# Patient Record
Sex: FEMALE | Race: BLACK OR AFRICAN AMERICAN | Employment: FULL TIME | URBAN - METROPOLITAN AREA
[De-identification: names, ages, dates, MRNs, and addresses within clinical notes are randomized per-mention and may not be internally consistent; named-entity substitution may affect disease eponyms.]

---

## 2017-02-13 ENCOUNTER — TELEPHONE (OUTPATIENT)
Dept: FAMILY MEDICINE CLINIC | Age: 27
End: 2017-02-13

## 2017-02-13 NOTE — TELEPHONE ENCOUNTER
Pt is calling; wants to ask the nurse a question; refuses to tell me what her question is; please advise

## 2017-10-13 ENCOUNTER — OFFICE VISIT (OUTPATIENT)
Dept: FAMILY MEDICINE CLINIC | Age: 27
End: 2017-10-13

## 2017-10-13 VITALS
SYSTOLIC BLOOD PRESSURE: 118 MMHG | BODY MASS INDEX: 38.04 KG/M2 | WEIGHT: 242.4 LBS | TEMPERATURE: 98 F | HEIGHT: 67 IN | DIASTOLIC BLOOD PRESSURE: 86 MMHG | HEART RATE: 76 BPM | OXYGEN SATURATION: 98 % | RESPIRATION RATE: 18 BRPM

## 2017-10-13 DIAGNOSIS — E55.9 VITAMIN D DEFICIENCY: ICD-10-CM

## 2017-10-13 DIAGNOSIS — M23.91 INTERNAL DERANGEMENT OF RIGHT KNEE: Primary | ICD-10-CM

## 2017-10-13 RX ORDER — ERGOCALCIFEROL 1.25 MG/1
50000 CAPSULE ORAL
Qty: 12 CAP | Refills: 3 | Status: SHIPPED | OUTPATIENT
Start: 2017-10-13 | End: 2017-10-13 | Stop reason: SDDI
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
## 2017-10-13 NOTE — MR AVS SNAPSHOT
Visit Information Date & Time Provider Department Dept. Phone Encounter #  
 10/13/2017  3:15 PM Rosemarie Bullock, [employer] [phone] [id number] Upcoming Health Maintenance Date Due Pneumococcal 19-64 Medium Risk (1 of 1 - PPSV23) 2/5/2010 DTaP/Tdap/Td series (1 - Tdap) 2/5/2012 PAP AKA CERVICAL CYTOLOGY 2/5/2012 HPV AGE 9Y-26Y (2 of 3 - Female 3 Dose Series) 2/10/2017 Allergies as of 10/13/2017  Review Complete On: 10/13/2017 By: Rosemarie Bullock MD  
 No Known Allergies Current Immunizations  Never Reviewed Name Date HPV (9-valent) 12/16/2016 Not reviewed this visit You Were Diagnosed With   
  
 Codes Comments Internal derangement of right knee    -  Primary ICD-10-CM: M23.91 
ICD-9-CM: 717.9 Vitamin D deficiency     ICD-10-CM: E55.9 ICD-9-CM: 268.9 Vitals BP Pulse Temp Resp Height(growth percentile) Weight(growth percentile) 118/86 (BP 1 Location: Left arm, BP Patient Position: Sitting) 76 98 °F (36.7 °C) (Oral) 18 5' 7\" (1.702 m) 242 lb 6.4 oz (110 kg) SpO2 BMI OB Status Smoking Status 98% 37.97 kg/m2 Having regular periods Current Some Day Smoker BMI and BSA Data Body Mass Index Body Surface Area  
 37.97 kg/m 2 2.28 m 2 Preferred Pharmacy Pharmacy Name Phone [id number] [address] [phone] Your Updated Medication List  
  
   
This list is accurate as of: 10/13/17  3:58 PM.  Always use your most recent med list.  
  
  
  
  
 ergocalciferol 50,000 unit capsule Commonly known as:  ERGOCALCIFEROL Take 1 Cap by mouth every seven (7) days. HYDROcodone-acetaminophen 5-325 mg per tablet Commonly known as:  [place] Cuff Take 1 Tab by mouth every four (4) hours as needed for Pain. Max Daily Amount: 6 Tabs. Prescriptions Sent to Pharmacy  Refills  
 ergocalciferol (ERGOCALCIFEROL) 50,000 unit capsule 3  
 Sig: Take 1 Cap by mouth every seven (7) days. Class: Normal  
 Pharmacy: [id number] [place], [address] #: [phone] Route: Oral  
  
To-Do List   
 10/13/2017 Imaging:  MRI KNEE RT WO CONT Patient Instructions Right knee MRI, Further disposition pending MRI results. Resume  Vitamin D2, 50,000 international units weekly, to be continued indefinitely pending future Vitamin D levels. The prescription(s) has been sent to the patient's pharmacy. Start OTC Vitamin D3, 2,000 international units daily. A vitamin D level should be checked again in 6 months. Introducing [facility]! Carolina Merino introduces [employer] patient portal. Now you can access parts of your medical record, email your doctor's office, and request medication refills online. 1. In your internet browser, go to [email or web address] 2. Click on the First Time User? Click Here link in the Sign In box. You will see the New Member Sign Up page. 3. Enter your [employer] Access Code exactly as it appears below. You will not need to use this code after youve completed the sign-up process. If you do not sign up before the expiration date, you must request a new code. · [employer] Access Code: [email or web address] Expires: 1/11/2018  3:58 PM 
 
4. Enter the last four digits of your Social Security Number (xxxx) and Date of Birth (mm/dd/yyyy) as indicated and click Submit. You will be taken to the next sign-up page. 5. Create a Akusticat ID. This will be your [employer] login ID and cannot be changed, so think of one that is secure and easy to remember. 6. Create a [employer] password. You can change your password at any time. 7. Enter your Password Reset Question and Answer. This can be used at a later time if you forget your password. 8. Enter your e-mail address. You will receive e-mail notification when new information is available in [address] 9. Click Sign Up. You can now view and download portions of your medical record. 10. Click the Download Summary menu link to download a portable copy of your medical information. If you have questions, please visit the Frequently Asked Questions section of the [employer] website. Remember, [employer] is NOT to be used for urgent needs. For medical emergencies, dial 911. Now available from your iPhone and Android! Please provide this summary of care documentation to your next provider. Your primary care clinician is listed as Traci Aguilar. If you have any questions after today's visit, please call [phone].

## 2017-10-13 NOTE — PATIENT INSTRUCTIONS
Right knee MRI, Further disposition pending MRI results. Resume  Vitamin D2, 50,000 international units weekly, to be continued indefinitely pending future Vitamin D levels. The prescription(s) has been sent to the patient's pharmacy. Start OTC Vitamin D3, 2,000 international units daily. A vitamin D level should be checked again in 6 months.

## 2017-10-13 NOTE — PROGRESS NOTES
Bimal Vu is a 33 y.o. female here for follow up       Bimal Vu is a 33 y.o. female (: 1990) presenting to address:    Chief Complaint   Patient presents with    Knee Pain     pt states she's here for a follow up for her R knee        Vitals:    10/13/17 1522   BP: 118/86   Pulse: 76   Resp: 18   Temp: 98 °F (36.7 °C)   TempSrc: Oral   SpO2: 98%   Weight: 242 lb 6.4 oz (110 kg)   Height: 5' 7\" (1.702 m)   PainSc:   6   PainLoc: Knee       Hearing/Vision:   No exam data present    Learning Assessment:     Learning Assessment 2016   PRIMARY LEARNER Patient   HIGHEST LEVEL OF EDUCATION - PRIMARY LEARNER  DID NOT GRADUATE HIGH SCHOOL   BARRIERS PRIMARY LEARNER NONE   CO-LEARNER CAREGIVER No   PRIMARY LANGUAGE ENGLISH   LEARNER PREFERENCE PRIMARY DEMONSTRATION     READING     LISTENING   ANSWERED BY patient   RELATIONSHIP SELF     Depression Screening:     PHQ over the last two weeks 10/13/2017   Little interest or pleasure in doing things Not at all   Feeling down, depressed or hopeless Not at all   Total Score PHQ 2 0     Fall Risk Assessment:   No flowsheet data found. Abuse Screening:   No flowsheet data found. Coordination of Care Questionaire:   1. Have you been to the ER, urgent care clinic since your last visit? Hospitalized since your last visit? NO    2. Have you seen or consulted any other health care providers outside of the [address] [place] [address] since your last visit? Include any pap smears or colon screening. NO    Advanced Directive:   1. Do you have an Advanced Directive? NO    2. Would you like information on Advanced Directives?  NO

## 2017-10-23 ENCOUNTER — HOSPITAL ENCOUNTER (OUTPATIENT)
Dept: MRI IMAGING | Age: 27
Discharge: HOME | End: 2017-10-23
Attending: FAMILY MEDICINE
Payer: MEDICAID

## 2017-10-23 DIAGNOSIS — M23.91 INTERNAL DERANGEMENT OF RIGHT KNEE: ICD-10-CM

## 2017-10-23 PROCEDURE — 73721 MRI JNT OF LWR EXTRE W/O DYE: CPT

## 2017-10-24 ENCOUNTER — TELEPHONE (OUTPATIENT)
Dept: FAMILY MEDICINE CLINIC | Age: 27
End: 2017-10-24

## 2017-10-24 DIAGNOSIS — M23.91 INTERNAL DERANGEMENT OF RIGHT KNEE: Primary | ICD-10-CM

## 2017-10-24 NOTE — PROGRESS NOTES
Please advise Ms. Maurice Ross that the MRI of her right knee showed damage to the cartilage and also abnormal appearance of the anterior cruciate ligament. I am sending a referral to an orthopaedic surgeon (Dr. Marco Fraser) for her so that she can discuss treatment options with him.

## 2017-11-01 ENCOUNTER — TELEPHONE (OUTPATIENT)
Dept: FAMILY MEDICINE CLINIC | Age: 27
End: 2017-11-01

## 2017-11-01 NOTE — TELEPHONE ENCOUNTER
Spoke with pt. Advised to call the facility she had the MRI done and to get a cd to take with her.  Pt stated she understood

## 2017-11-01 NOTE — TELEPHONE ENCOUNTER
Pt called requesting MRI results/images for upcoming appt with [employer].  Please f/u with pt

## 2017-11-27 ENCOUNTER — TELEPHONE (OUTPATIENT)
Dept: FAMILY MEDICINE CLINIC | Age: 27
End: 2017-11-27

## 2017-11-27 NOTE — TELEPHONE ENCOUNTER
Pt called stating she has paperwork for unemployment that she needs to have filled out but she is unsure if Dr. Violette Bunch needs to complete it or her surgeon. Pt states she is unable to work due to her knee problems.      Please advise

## 2017-11-27 NOTE — TELEPHONE ENCOUNTER
Spoke with pt. Per Dr. Mukund Kingsley pt will need to have Ortho take care of the paper work for her. Pt stated she understood.

## 2018-01-18 ENCOUNTER — TELEPHONE (OUTPATIENT)
Dept: FAMILY MEDICINE CLINIC | Age: 28
End: 2018-01-18

## 2018-03-28 ENCOUNTER — OFFICE VISIT (OUTPATIENT)
Dept: FAMILY MEDICINE CLINIC | Age: 28
End: 2018-03-28

## 2018-03-28 ENCOUNTER — TELEPHONE (OUTPATIENT)
Dept: FAMILY MEDICINE CLINIC | Age: 28
End: 2018-03-28

## 2018-03-28 VITALS
HEART RATE: 117 BPM | OXYGEN SATURATION: 98 % | WEIGHT: 234.6 LBS | SYSTOLIC BLOOD PRESSURE: 122 MMHG | HEIGHT: 67 IN | TEMPERATURE: 98 F | DIASTOLIC BLOOD PRESSURE: 84 MMHG | RESPIRATION RATE: 22 BRPM | BODY MASS INDEX: 36.82 KG/M2

## 2018-03-28 DIAGNOSIS — M23.92 INTERNAL DERANGEMENT OF LEFT KNEE: Primary | ICD-10-CM

## 2018-03-28 DIAGNOSIS — Z01.818 PREOP EXAMINATION: ICD-10-CM

## 2018-03-28 NOTE — PATIENT INSTRUCTIONS
The patient does not appear to be at increased risk for the planned procedure and is medically cleared for surgery  She is cautioned to report any sign of recurrence of infection in the left axilla:  Report any signs of infection such as increased pain, redness, fever or purulent drainage. Advised that surgery is not recommended with a current infection.

## 2018-03-28 NOTE — PROGRESS NOTES
Silver Blood is a 33 y.o. female here for Pre op       Silver Blood is a 33 y.o. female (: 1990) presenting to address:    Chief Complaint   Patient presents with    Pre-op Exam     pt will be having R knee surgery on 4/3/18. here for Pre op clearance       Vitals:    18 0949   BP: 122/84   Pulse: (!) 117   Resp: 22   Temp: 98 °F (36.7 °C)   TempSrc: Oral   SpO2: 98%   Weight: 234 lb 9.6 oz (106.4 kg)   Height: 5' 7\" (1.702 m)   PainSc:   0 - No pain       Hearing/Vision:   No exam data present    Learning Assessment:     Learning Assessment 2016   PRIMARY LEARNER Patient   HIGHEST LEVEL OF EDUCATION - PRIMARY LEARNER  DID NOT GRADUATE HIGH SCHOOL   BARRIERS PRIMARY LEARNER NONE   CO-LEARNER CAREGIVER No   PRIMARY LANGUAGE ENGLISH   LEARNER PREFERENCE PRIMARY DEMONSTRATION     READING     LISTENING   ANSWERED BY patient   RELATIONSHIP SELF     Depression Screening:     PHQ over the last two weeks 3/28/2018   Little interest or pleasure in doing things Not at all   Feeling down, depressed or hopeless Not at all   Total Score PHQ 2 0     Fall Risk Assessment:   No flowsheet data found. Abuse Screening:   No flowsheet data found. Coordination of Care Questionaire:   1. Have you been to the ER, urgent care clinic since your last visit? Hospitalized since your last visit? NO    2. Have you seen or consulted any other health care providers outside of the [address] [place] [address] since your last visit? Include any pap smears or colon screening. NO    Advanced Directive:   1. Do you have an Advanced Directive? NO    2. Would you like information on Advanced Directives?  NO

## 2018-03-28 NOTE — TELEPHONE ENCOUNTER
Pt's pre op form from [facility] was signed by Dr. Dolores Head along with attached OV note and labs, faxed to [phone].  Confirmation received

## 2018-03-28 NOTE — PROGRESS NOTES
HISTORY OF PRESENT ILLNESS  Janet Ventura is a 33 y.o. female. Pre-op Exam   The history is provided by the patient and medical records. Associated symptoms include shortness of breath (when anxious and with exertion). Pertinent negatives include no chest pain, no abdominal pain and no headaches. History reviewed. No pertinent past medical history. Past Surgical History:   Procedure Laterality Date    HX  SECTION      done 5 years ago      Family History   Problem Relation Age of Onset    Hypertension Mother     Hypertension Maternal Grandmother     Diabetes Maternal Grandmother     Cancer Neg Hx     Heart Disease Neg Hx      History   Smoking Status    Current Some Day Smoker   Smokeless Tobacco    Current User     No Known Allergies    Patient Active Problem List   Diagnosis Code    Tobacco use Z72.0    Obesity (BMI 35.0-39.9 without comorbidity) E66.9    Chronic pain of right knee M25.561, G89.29    Vitamin D deficiency E55.9     No current outpatient prescriptions on file. Review of Systems   Constitutional: Negative for fever and weight loss. HENT: Negative for congestion and sore throat. Respiratory: Positive for shortness of breath (when anxious and with exertion). Negative for cough and wheezing. Cardiovascular: Negative for chest pain, palpitations and leg swelling. Gastrointestinal: Negative for abdominal pain, diarrhea, nausea and vomiting. Genitourinary: Negative for dysuria and urgency. Musculoskeletal: Positive for joint pain (right knee). Skin:        Recurrent boils left axilla, recently spontaneously drained (about 2 weeks ago)   Neurological: Negative for dizziness, speech change, focal weakness and headaches.      Visit Vitals    /84 (BP 1 Location: Left arm, BP Patient Position: Sitting)    Pulse (!) 117    Temp 98 °F (36.7 °C) (Oral)    Resp 22    Ht 5' 7\" (1.702 m)    Wt 234 lb 9.6 oz (106.4 kg)    SpO2 98%    BMI 36.74 kg/m2 Physical Exam   Constitutional: She is oriented to person, place, and time. She appears well-developed and well-nourished. Obese, some recent improvement   HENT:   Head: Normocephalic. Right Ear: Tympanic membrane and ear canal normal.   Left Ear: Tympanic membrane and ear canal normal.   Mouth/Throat: Oropharynx is clear and moist.   Eyes: Conjunctivae and EOM are normal.   Neck: Neck supple. Cardiovascular: Normal rate, regular rhythm and normal heart sounds. Pulmonary/Chest: Effort normal and breath sounds normal.   Abdominal: Soft. There is no tenderness. Lymphadenopathy:     She has no cervical adenopathy. Neurological: She is alert and oriented to person, place, and time. Skin: Skin is warm and dry. Left axilla with chronic changes consistent with hidradenitis suppurative, currently without erythema, induration, tenderness, drainage   Psychiatric: She has a normal mood and affect. Her behavior is normal.   Nursing note and vitals reviewed. Results for Dayanna Moreira (MRN [id number]) as of 3/28/2018 08:22   Ref.  Range 3/26/2018 16:41   WBC x 10*3 Latest Ref Range: 4.0 - 11.0 K/uL 7.9   RBC x 10*6 Latest Ref Range: 3.80 - 5.20 M/uL 4.46   HGB Latest Ref Range: 11.7 - 15.5 g/dL 13.5   HCT Latest Ref Range: 35.1 - 46.5 % 40.8   MCV Latest Ref Range: 80 - 95 fL 92   MCH Latest Ref Range: 26 - 34 pg 30   MCHC Latest Ref Range: 31 - 36 g/dL 33   RDW Latest Ref Range: 10.0 - 15.5 % 13.4   Platelet Latest Ref Range: 140 - 440 K/uL 227   MPV Latest Ref Range: 9.0 - 13.0 fL 11.8   aPTT Latest Ref Range: 22 - 36 sec 25   INR Latest Ref Range: 0.89 - 1.29  1.00   PROTIME Latest Ref Range: 9.0 - 13.0 sec 10.7   Glucose  Latest Ref Range: 70 - 99 mg/dL 76   BUN Latest Ref Range: 6 - 22 mg/dL 11   CREATININE Latest Ref Range: 0.5 - 1.2 mg/dL 0.7   eGFR  Latest Ref Range: >60.0  >60.0   eGFR Non African American Latest Ref Range: >60.0  >60.0   SODIUM Latest Ref Range: 133 - 145 mmol/L 141 Potassium Latest Ref Range: 3.5 - 5.5 mmol/L 3.7   CHLORIDE Latest Ref Range: 98 - 110 mmol/L 99   CO2 Latest Ref Range: 20 - 32 mmol/L 24   CALCIUM Latest Ref Range: 8.4 - 10.5 mg/dL 9.5   ANION GAP Latest Units: mmol/L 18.0     ASSESSMENT and PLAN    ICD-10-CM ICD-9-CM    1. Internal derangement of left knee M23.92 717.9    2. Preop examination Z01.818 V72.84    The patient does not appear to be at increased risk for the planned procedure and is medically cleared for surgery  She is cautioned to report any sign of recurrence of infection in the left axilla:  Report any signs of infection such as increased pain, redness, fever or purulent drainage. Advised that surgery is not recommended with a current infection.

## 2018-03-28 NOTE — MR AVS SNAPSHOT
Chastity e 
 
 
 [address] [facility] [address] [phone] Patient: Janet Ventura MRN: [id number] ASHWINI:[id number] Visit Information Date & Time Provider Department Dept. Phone Encounter #  
 3/28/2018 10:00 AM Frieda Garcia, Adi Quiroz [phone] [id number] Upcoming Health Maintenance Date Due Pneumococcal 19-64 Medium Risk (1 of 1 - PPSV23) 2/5/2010 DTaP/Tdap/Td series (1 - Tdap) 2/5/2012 PAP AKA CERVICAL CYTOLOGY 2/5/2012 Allergies as of 3/28/2018  Review Complete On: 3/28/2018 By: Frieda Garcia MD  
 No Known Allergies Current Immunizations  Never Reviewed Name Date HPV (9-valent) 12/16/2016 Not reviewed this visit You Were Diagnosed With   
  
 Codes Comments Internal derangement of left knee    -  Primary ICD-10-CM: M23.92 
ICD-9-CM: 717.9 Preop examination     ICD-10-CM: I20.206 ICD-9-CM: V72.84 Vitals BP Pulse Temp Resp Height(growth percentile) Weight(growth percentile) 122/84 (BP 1 Location: Left arm, BP Patient Position: Sitting) (!) 117 98 °F (36.7 °C) (Oral) 22 5' 7\" (1.702 m) 234 lb 9.6 oz (106.4 kg) SpO2 BMI OB Status Smoking Status 98% 36.74 kg/m2 Having regular periods Current Some Day Smoker BMI and BSA Data Body Mass Index Body Surface Area 36.74 kg/m 2 2.24 m 2 Preferred Pharmacy Pharmacy Name Phone [id number] El[place] Lizeth, [address] [facility] [phone] Your Updated Medication List  
  
Notice  As of 3/28/2018 10:16 AM  
 You have not been prescribed any medications. Patient Instructions The patient does not appear to be at increased risk for the planned procedure and is medically cleared for surgery She is cautioned to report any sign of recurrence of infection in the left axilla: 
Report any signs of infection such as increased pain, redness, fever or purulent drainage. Advised that surgery is not recommended with a current infection. Introducing [facility]! Richa Grzegorzreema introduces [employer] patient portal. Now you can access parts of your medical record, email your doctor's office, and request medication refills online. 1. In your internet browser, go to [email or web address] 2. Click on the First Time User? Click Here link in the Sign In box. You will see the New Member Sign Up page. 3. Enter your [employer] Access Code exactly as it appears below. You will not need to use this code after youve completed the sign-up process. If you do not sign up before the expiration date, you must request a new code. · [employer] Access Code: [email or web address] Expires: 6/26/2018 10:15 AM 
 
4. Enter the last four digits of your Social Security Number (xxxx) and Date of Birth (mm/dd/yyyy) as indicated and click Submit. You will be taken to the next sign-up page. 5. Create a [employer] ID. This will be your [employer] login ID and cannot be changed, so think of one that is secure and easy to remember. 6. Create a [employer] password. You can change your password at any time. 7. Enter your Password Reset Question and Answer. This can be used at a later time if you forget your password. 8. Enter your e-mail address. You will receive e-mail notification when new information is available in [address] 9. Click Sign Up. You can now view and download portions of your medical record. 10. Click the Download Summary menu link to download a portable copy of your medical information. If you have questions, please visit the Frequently Asked Questions section of the [employer] website. Remember, [employer] is NOT to be used for urgent needs. For medical emergencies, dial 911. Now available from your iPhone and Android! Please provide this summary of care documentation to your next provider. Your primary care clinician is listed as Sangita Jeffrey. If you have any questions after today's visit, please call [phone].

## 2020-12-28 ENCOUNTER — OFFICE VISIT (OUTPATIENT)
Dept: FAMILY MEDICINE CLINIC | Age: 30
End: 2020-12-28
Payer: MEDICAID

## 2020-12-28 VITALS
OXYGEN SATURATION: 98 % | BODY MASS INDEX: 38.27 KG/M2 | HEART RATE: 82 BPM | DIASTOLIC BLOOD PRESSURE: 78 MMHG | TEMPERATURE: 96.9 F | RESPIRATION RATE: 15 BRPM | HEIGHT: 67 IN | WEIGHT: 243.8 LBS | SYSTOLIC BLOOD PRESSURE: 128 MMHG

## 2020-12-28 DIAGNOSIS — S01.81XA FACIAL LACERATION, INITIAL ENCOUNTER: Primary | ICD-10-CM

## 2020-12-28 DIAGNOSIS — S09.93XA DENTAL INJURY, INITIAL ENCOUNTER: ICD-10-CM

## 2020-12-28 DIAGNOSIS — N76.0 ACUTE VAGINITIS: ICD-10-CM

## 2020-12-28 PROCEDURE — 99214 OFFICE O/P EST MOD 30 MIN: CPT | Performed by: FAMILY MEDICINE

## 2020-12-28 RX ORDER — FLUCONAZOLE 150 MG/1
150 TABLET ORAL DAILY
Qty: 1 TAB | Refills: 0 | Status: SHIPPED | OUTPATIENT
Start: 2020-12-28 | End: 2020-12-29

## 2020-12-28 NOTE — PATIENT INSTRUCTIONS
Diflucan 150 mg, 1 tablet x 1 dose Further disposition pending vaginal swab if indicated Follow-up for failure to improve

## 2020-12-28 NOTE — PROGRESS NOTES
HISTORY OF PRESENT ILLNESS  Charly Jade is a 35 y.o. female. She presents for follow-up after evaluation at [facility] emergency department on 12/6/2020 with facial trauma resulting in through and through lacerations to the chin and lower lip as well as displacement of lower teeth. Assessment and plan from emergency department note:  Assessment/Differential Diagnosis:   Lip and chin laceration through and through repaired as per procedure notes. Loose teeth without clear fracture, minimally loose, panorex without evidence of jaw fracture. ED Course/Medical Decision Making:   vss  nad  Lacerations repaired as per notes  Tetanus not updated but patient was called and a voicemail was left recommending prompt follow up for a tetanus booster. Return in 5-7 days for removal of 4 lip sutures and 3 chin sutures. Dental follow up for loose teeth. Ibuprofen and tylenol as needed for pain,   Chlorhexidine mouth wash for 7 days. She returned for follow-up and suture removal in the emergency department on 12/12/2020    Today she reports that there are no problems at the laceration sites and that she has dental follow-up planned. Her primary concern today is vaginal pruritus and discharge which she attributes to her course of antibiotics prescribed because the laceration. Review of Systems   Constitutional: Negative for fever. Gastrointestinal: Negative for abdominal pain. Genitourinary: Negative for dysuria, frequency and urgency. Vaginal pruritus and discharge see HPI     Visit Vitals  /78 (BP 1 Location: Left arm, BP Patient Position: Sitting)   Pulse 82   Temp 96.9 °F (36.1 °C) (Temporal)   Resp 15   Ht 5' 7\" (1.702 m)   Wt 243 lb 12.8 oz (110.6 kg)   LMP 12/23/2020 (Approximate)   SpO2 98%   BMI 38.18 kg/m²       Physical Exam  Vitals signs and nursing note reviewed. Constitutional:       Appearance: She is well-developed. HENT:      Head: Normocephalic. Neck:      Musculoskeletal: Neck supple. Cardiovascular:      Rate and Rhythm: Normal rate. Pulmonary:      Effort: Pulmonary effort is normal.   Skin:     General: Skin is warm and dry. Neurological:      Mental Status: She is alert and oriented to person, place, and time. Psychiatric:         Behavior: Behavior normal.         ASSESSMENT and PLAN    ICD-10-CM ICD-9-CM    1. Facial laceration, initial encounter  S01.81XA 873.40    2. Dental injury, initial encounter  S09. 93XA 873.63    3. Acute vaginitis  N76.0 616.10 NUAB VAGINITIS PLUS      fluconazole (DIFLUCAN) 150 mg tablet   Health maintenance:        Diflucan 150 mg, 1 tablet x 1 dose  Further disposition pending vaginal swab if indicated  Follow-up for failure to improve    Tian Kulkarni MD      PLEASE NOTE:   This document has been produced using voice recognition software. Unrecognized errors in transcription may be present.

## 2020-12-28 NOTE — PROGRESS NOTES
Ana Rosario is a 35 y.o. female (: 1990) presenting to address:    Chief Complaint   Patient presents with   [address] [facility] ED Follow-up     open would on lip       Vitals:    20 1334   BP: 128/78   Pulse: 82   Resp: 15   Temp: 96.9 °F (36.1 °C)   TempSrc: Temporal   SpO2: 98%   Weight: 243 lb 12.8 oz (110.6 kg)   Height: 5' 7\" (1.702 m)   PainSc:   0 - No pain   LMP: 2020       Hearing/Vision:   No exam data present    Learning Assessment:     Learning Assessment 2016   PRIMARY LEARNER Patient   HIGHEST LEVEL OF EDUCATION - PRIMARY LEARNER  DID NOT GRADUATE HIGH SCHOOL   BARRIERS PRIMARY LEARNER NONE   CO-LEARNER CAREGIVER No   PRIMARY LANGUAGE ENGLISH   LEARNER PREFERENCE PRIMARY DEMONSTRATION     READING     LISTENING   ANSWERED BY patient   RELATIONSHIP SELF     Depression Screening:     3 most recent PHQ Screens 2020   Little interest or pleasure in doing things Not at all   Feeling down, depressed, irritable, or hopeless Not at all   Total Score PHQ 2 0     Fall Risk Assessment:   No flowsheet data found. Abuse Screening:   No flowsheet data found. Coordination of Care Questionaire:   1. Have you been to the ER, urgent care clinic since your last visit? Hospitalized since your last visit? YES, [facility] for laceration on lip    2. Have you seen or consulted any other health care providers outside of the [address] [place] [address] since your last visit? Include any pap smears or colon screening. NO    Advanced Directive:   1. Do you have an Advanced Directive? NO    2. Would you like information on Advanced Directives? NO    Immunization/s administered 2020 by Emma Cummins LPN with guardian's consent. Patient tolerated procedure well. No reactions noted.

## 2021-01-04 LAB
BACTERIAL VAGINOSIS, NAA: POSITIVE
CANDIDA ALBICANS, NAA, 180056: POSITIVE
CANDIDA GLABRATA, NAA, 180057: NEGATIVE
CANDIDA KRUSEI, NAA, 180016: NEGATIVE
CHLAMYDIA TRACHOMATIS, NAA, 180097: NEGATIVE
NEISSERIA GONORRHOEAE, NAA, 180104: NEGATIVE
TRICH VAG BY NAA, 180087: POSITIVE

## 2021-01-05 DIAGNOSIS — A59.9 TRICHOMONAS VAGINALIS INFECTION: ICD-10-CM

## 2021-01-05 DIAGNOSIS — B96.89 BACTERIAL VAGINOSIS: Primary | ICD-10-CM

## 2021-01-05 DIAGNOSIS — N76.0 BACTERIAL VAGINOSIS: Primary | ICD-10-CM

## 2021-01-05 RX ORDER — METRONIDAZOLE 500 MG/1
TABLET ORAL
Qty: 14 TAB | Refills: 0 | Status: SHIPPED | OUTPATIENT
Start: 2021-01-05 | End: 2021-03-31 | Stop reason: ALTCHOICE

## 2021-01-05 NOTE — PROGRESS NOTES
Please advise that the lab results from the vaginal swab confirmed a yeast vaginitis but also showed a vaginal infection with trichomonas and bacterial vaginosis. A prescription has been sent to her pharmacy for metronidazole 500 mg twice daily for 7 days. She should not consume alcohol while taking metronidazole. She should advise any sexual partners that they need to see their healthcare providers for treatment of trichomonas.

## 2021-03-31 ENCOUNTER — OFFICE VISIT (OUTPATIENT)
Dept: FAMILY MEDICINE CLINIC | Age: 31
End: 2021-03-31
Payer: MEDICAID

## 2021-03-31 ENCOUNTER — APPOINTMENT (OUTPATIENT)
Dept: FAMILY MEDICINE CLINIC | Age: 31
End: 2021-03-31

## 2021-03-31 VITALS
BODY MASS INDEX: 39.39 KG/M2 | OXYGEN SATURATION: 100 % | HEIGHT: 67 IN | DIASTOLIC BLOOD PRESSURE: 72 MMHG | TEMPERATURE: 98.2 F | SYSTOLIC BLOOD PRESSURE: 110 MMHG | WEIGHT: 251 LBS | HEART RATE: 92 BPM | RESPIRATION RATE: 16 BRPM

## 2021-03-31 DIAGNOSIS — Z13.220 ENCOUNTER FOR LIPID SCREENING FOR CARDIOVASCULAR DISEASE: ICD-10-CM

## 2021-03-31 DIAGNOSIS — E66.01 SEVERE OBESITY (BMI 35.0-39.9) WITH COMORBIDITY (HCC): ICD-10-CM

## 2021-03-31 DIAGNOSIS — Z13.6 ENCOUNTER FOR LIPID SCREENING FOR CARDIOVASCULAR DISEASE: ICD-10-CM

## 2021-03-31 DIAGNOSIS — Z11.59 NEED FOR HEPATITIS C SCREENING TEST: ICD-10-CM

## 2021-03-31 DIAGNOSIS — E55.9 VITAMIN D DEFICIENCY: ICD-10-CM

## 2021-03-31 DIAGNOSIS — E55.9 VITAMIN D DEFICIENCY: Primary | ICD-10-CM

## 2021-03-31 DIAGNOSIS — Z72.0 TOBACCO USE: ICD-10-CM

## 2021-03-31 PROCEDURE — 99213 OFFICE O/P EST LOW 20 MIN: CPT | Performed by: FAMILY MEDICINE

## 2021-03-31 NOTE — PROGRESS NOTES
Deisi Cosby presents today for   Chief Complaint   Patient presents with    Follow-up     right ear pain question       Is someone accompanying this pt? no    Is the patient using any DME equipment during OV? no    Depression Screening:  3 most recent PHQ Screens 3/31/2021   Mindi Andrews 37 Not Done Patient Decline   Little interest or pleasure in doing things Not at all   Feeling down, depressed, irritable, or hopeless Not at all   Total Score PHQ 2 0       Learning Assessment:  Learning Assessment 12/16/2016   PRIMARY LEARNER Patient   HIGHEST LEVEL OF EDUCATION - PRIMARY LEARNER  DID NOT GRADUATE HIGH SCHOOL   BARRIERS PRIMARY LEARNER NONE   CO-LEARNER CAREGIVER No   PRIMARY LANGUAGE ENGLISH   LEARNER PREFERENCE PRIMARY DEMONSTRATION     READING     LISTENING   ANSWERED BY patient   RELATIONSHIP SELF       Travel Screening:    Travel Screening     Question   Response    In the last month, have you been in contact with someone who was confirmed or suspected to have Paul Looneyger / COVID-19? No / Unsure    Have you had a COVID-19 viral test in the last 14 days? No    Do you have any of the following new or worsening symptoms? None of these    Have you traveled internationally or domestically in the last month? No      Travel History   Travel since 02/28/21     No documented travel since 02/28/21          Health Maintenance reviewed and discussed and ordered per Provider. Health Maintenance Due   Topic Date Due    Hepatitis C Screening  Never done    COVID-19 Vaccine (1) Never done    DTaP/Tdap/Td series (1 - Tdap) Never done    PAP AKA CERVICAL CYTOLOGY  Never done   . Coordination of Care:  1. Have you been to the ER, urgent care clinic since your last visit? Hospitalized since your last visit? no    2. Have you seen or consulted any other health care providers outside of the [address] [place] [address] since your last visit? Include any pap smears or colon screening. Yes.

## 2021-03-31 NOTE — PROGRESS NOTES
HISTORY OF PRESENT ILLNESS  Verner Rotunda is a 28 y.o. female. 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 Physical requirements listed by the employer appear to be within the patient's capability. She is also due for preventative care updates. Mr#: [id number]      History reviewed. No pertinent past medical history. Past Surgical History:   Procedure Laterality Date    HX  SECTION      done 5 years ago     HX ORTHOPAEDIC Right 2018    knee surgery       Family History   Problem Relation Age of Onset    Hypertension Mother     Hypertension Maternal Grandmother     Diabetes Maternal Grandmother     Cancer Neg Hx     Heart Disease Neg Hx        Allergies   Allergen Reactions    Drug Class [Penicillins] Other (comments)     Patient prone to yeast infections w/ABX       Social History     Tobacco Use   Smoking Status Current Some Day Smoker   Smokeless Tobacco Current User       Social History     Substance and Sexual Activity   Alcohol Use Yes    Comment: occasional drinker          Patient Active Problem List   Diagnosis Code    Tobacco use Z72.0    Obesity (BMI 35.0-39.9 without comorbidity) E66.9    Chronic pain of right knee M25.561, G89.29    Vitamin D deficiency E55.9       No current outpatient medications on file. Review of Systems   Constitutional: Negative for weight loss. Eyes: Negative for blurred vision. Respiratory: Negative for cough, shortness of breath and wheezing. Cardiovascular: Negative for chest pain, palpitations and leg swelling. Gastrointestinal: Negative for abdominal pain, blood in stool, constipation, diarrhea, heartburn, melena, nausea and vomiting.    Musculoskeletal: Positive for joint pain (episodic mild right knee pain, history of orthopedic surgical procedure in 2018 ). Neurological: Positive for headaches. Negative for dizziness. Visit Vitals  /72 (BP 1 Location: Right arm, BP Patient Position: Sitting, BP Cuff Size: Adult)   Pulse 92   Temp 98.2 °F (36.8 °C) (Temporal)   Resp 16   Ht 5' 7\" (1.702 m)   Wt 251 lb (113.9 kg)   LMP 03/23/2021 (Exact Date)   SpO2 100%   BMI 39.31 kg/m²       Physical Exam  Vitals signs and nursing note reviewed. Constitutional:       Appearance: She is well-developed. HENT:      Head: Normocephalic. Right Ear: Tympanic membrane and ear canal normal.      Left Ear: Tympanic membrane and ear canal normal.   Eyes:      Extraocular Movements: Extraocular movements intact. Conjunctiva/sclera: Conjunctivae normal.   Neck:      Musculoskeletal: Neck supple. Cardiovascular:      Rate and Rhythm: Normal rate and regular rhythm. Heart sounds: Normal heart sounds. Pulmonary:      Effort: Pulmonary effort is normal.      Breath sounds: Normal breath sounds. Musculoskeletal:         General: No swelling, tenderness or deformity. Right lower leg: No edema. Left lower leg: No edema. Comments: Right knee no effusion or joint line tenderness, negative anterior drawer, no varus or valgus instability, negative patellar grind     Lymphadenopathy:      Cervical: No cervical adenopathy. Skin:     General: Skin is warm and dry. Neurological:      Mental Status: She is alert and oriented to person, place, and time. Psychiatric:         Mood and Affect: Mood normal.         Behavior: Behavior normal.         Thought Content: Thought content normal.         Judgment: Judgment normal.         ASSESSMENT and PLAN    ICD-10-CM ICD-9-CM    1. Vitamin D deficiency  E55.9 268.9 VITAMIN D, 25 HYDROXY   2. Tobacco use  Z72.0 305.1    3. Severe obesity (BMI 35.0-39. 9) with comorbidity ([facility]  E66.01 278.01 CBC WITH AUTOMATED DIFF      METABOLIC PANEL, BASIC   4.  Need for hepatitis C screening test  Z11.59 V73.89 HEPATITIS C AB   5. Encounter for lipid screening for cardiovascular disease  Z13.220 V77.91 LIPID PANEL    Z13.6 V81.2    Health maintenance:  COVID-19 immunization recommended  Hepatitis C screening  Pap smear-patient will schedule    Schedule Pap smear with the health department as discussed  Form completed for job  Work hard to avoid dietary starch and sugar and follow program of regular aerobic exercise  Lab today, further disposition pending lab results if indicated    Carlos Manuel Mejia MD      PLEASE NOTE:   This document has been produced using voice recognition software. Unrecognized errors in transcription may be present.

## 2021-03-31 NOTE — PATIENT INSTRUCTIONS
Schedule Pap smear with the health department as discussed Form completed for job Work hard to avoid dietary starch and sugar and follow program of regular aerobic exercise Lab today, further disposition pending lab results if indicated

## 2021-04-01 DIAGNOSIS — E55.9 VITAMIN D DEFICIENCY: Primary | ICD-10-CM

## 2021-04-01 PROBLEM — E78.00 HYPERCHOLESTEROLEMIA: Status: ACTIVE | Noted: 2021-04-01

## 2021-04-01 LAB
25(OH)D3 SERPL-MCNC: 18.4 NG/ML (ref 32–100)
ABSOLUTE LYMPHOCYTE COUNT, 10803: 2.5 K/UL (ref 1–4.8)
ANION GAP SERPL CALC-SCNC: 14 MMOL/L (ref 3–15)
BASOPHILS # BLD: 0 K/UL (ref 0–0.2)
BASOPHILS NFR BLD: 1 % (ref 0–2)
BUN SERPL-MCNC: 12 MG/DL (ref 6–22)
CALCIUM SERPL-MCNC: 9 MG/DL (ref 8.4–10.5)
CHLORIDE SERPL-SCNC: 103 MMOL/L (ref 98–110)
CHOLEST SERPL-MCNC: 226 MG/DL (ref 110–200)
CO2 SERPL-SCNC: 22 MMOL/L (ref 20–32)
CREAT SERPL-MCNC: 0.7 MG/DL (ref 0.5–1.2)
EOSINOPHIL # BLD: 0.2 K/UL (ref 0–0.5)
EOSINOPHIL NFR BLD: 2 % (ref 0–6)
ERYTHROCYTE [DISTWIDTH] IN BLOOD BY AUTOMATED COUNT: 13.9 % (ref 10–15.5)
GFRAA, 66117: >60
GFRNA, 66118: >60
GLUCOSE SERPL-MCNC: 94 MG/DL (ref 70–99)
GRANULOCYTES,GRANS: 61 % (ref 40–75)
HCT VFR BLD AUTO: 43.1 % (ref 35.1–46.5)
HCV AB SER IA-ACNC: NORMAL
HDLC SERPL-MCNC: 3.5 MG/DL (ref 0–5)
HDLC SERPL-MCNC: 65 MG/DL
HGB BLD-MCNC: 13.2 G/DL (ref 11.7–15.5)
LDL/HDL RATIO,LDHD: 2.3
LDLC SERPL CALC-MCNC: 149 MG/DL (ref 50–99)
LYMPHOCYTES, LYMLT: 29 % (ref 20–45)
MCH RBC QN AUTO: 30 PG (ref 26–34)
MCHC RBC AUTO-ENTMCNC: 31 G/DL (ref 31–36)
MCV RBC AUTO: 98 FL (ref 81–99)
MONOCYTES # BLD: 0.6 K/UL (ref 0.1–1)
MONOCYTES NFR BLD: 7 % (ref 3–12)
NEUTROPHILS # BLD AUTO: 5.2 K/UL (ref 1.8–7.7)
NON-HDL CHOLESTEROL, 011976: 161 MG/DL
PLATELET # BLD AUTO: 263 K/UL (ref 140–440)
PMV BLD AUTO: 11.5 FL (ref 9–13)
POTASSIUM SERPL-SCNC: 4.3 MMOL/L (ref 3.5–5.5)
RBC # BLD AUTO: 4.41 M/UL (ref 3.8–5.2)
SODIUM SERPL-SCNC: 139 MMOL/L (ref 133–145)
TRIGL SERPL-MCNC: 63 MG/DL (ref 40–149)
VLDLC SERPL CALC-MCNC: 13 MG/DL (ref 8–30)
WBC # BLD AUTO: 8.5 K/UL (ref 4–11)

## 2021-04-01 RX ORDER — ERGOCALCIFEROL 1.25 MG/1
50000 CAPSULE ORAL
Qty: 12 CAP | Refills: 4 | Status: SHIPPED | OUTPATIENT
Start: 2021-04-01 | End: 2022-04-21

## 2021-04-01 NOTE — PROGRESS NOTES
Please advise that lab results are essentially normal except for elevated cholesterol levels and a low vitamin D level. The high cholesterol levels do not require treatment with medication at this point but should be addressed by avoiding dietary starch and sugar and following a program of regular aerobic exercise. The combination of high cholesterol levels and cigarette smoking will increase her risk of heart attack and stroke so it is important to resolve those problems. Please ask her to start taking vitamin D2 or ergocalciferol 50,000 units weekly. Most likely she will need this medication indefinitely. A prescription has been sent to her pharmacy.

## 2021-04-29 ENCOUNTER — OFFICE VISIT (OUTPATIENT)
Dept: FAMILY MEDICINE CLINIC | Age: 31
End: 2021-04-29
Payer: MEDICAID

## 2021-04-29 VITALS
RESPIRATION RATE: 14 BRPM | TEMPERATURE: 97.8 F | OXYGEN SATURATION: 98 % | SYSTOLIC BLOOD PRESSURE: 130 MMHG | HEART RATE: 83 BPM | DIASTOLIC BLOOD PRESSURE: 88 MMHG | HEIGHT: 67 IN | BODY MASS INDEX: 40.18 KG/M2 | WEIGHT: 256 LBS

## 2021-04-29 DIAGNOSIS — Z56.9 PROBLEMS AT WORK: Primary | ICD-10-CM

## 2021-04-29 DIAGNOSIS — E66.9 OBESITY (BMI 35.0-39.9 WITHOUT COMORBIDITY): ICD-10-CM

## 2021-04-29 PROCEDURE — 99213 OFFICE O/P EST LOW 20 MIN: CPT | Performed by: FAMILY MEDICINE

## 2021-04-29 SDOH — ECONOMIC STABILITY - INCOME SECURITY: UNSPECIFIED PROBLEMS RELATED TO EMPLOYMENT: Z56.9

## 2021-04-29 NOTE — PROGRESS NOTES
Sofia Barrios presents today for   Chief Complaint   Patient presents with    Follow-up       Is someone accompanying this pt? no    Is the patient using any DME equipment during OV? no    Depression Screening:  3 most recent PHQ Screens 4/29/2021   [facility] Not Done Patient Decline   Little interest or pleasure in doing things Not at all   Feeling down, depressed, irritable, or hopeless Not at all   Total Score PHQ 2 0       Learning Assessment:  Learning Assessment 12/16/2016   PRIMARY LEARNER Patient   HIGHEST LEVEL OF EDUCATION - PRIMARY LEARNER  DID NOT GRADUATE HIGH SCHOOL   BARRIERS PRIMARY LEARNER NONE   CO-LEARNER CAREGIVER No   PRIMARY LANGUAGE ENGLISH   LEARNER PREFERENCE PRIMARY DEMONSTRATION     READING     LISTENING   ANSWERED BY patient   RELATIONSHIP SELF       Travel Screening:    Travel Screening     Question   Response    In the last month, have you been in contact with someone who was confirmed or suspected to have César Dials / COVID-19? No / Unsure    Have you had a COVID-19 viral test in the last 14 days? No    Do you have any of the following new or worsening symptoms? None of these    Have you traveled internationally or domestically in the last month? No      Travel History   Travel since 03/29/21     No documented travel since 03/29/21          Health Maintenance reviewed and discussed and ordered per Provider. Health Maintenance Due   Topic Date Due    DTaP/Tdap/Td series (2 - Tdap) 02/05/2002    COVID-19 Vaccine (1) Never done    PAP AKA CERVICAL CYTOLOGY  Never done   . Coordination of Care:  1. Have you been to the ER, urgent care clinic since your last visit? Hospitalized since your last visit? no    2. Have you seen or consulted any other health care providers outside of the [address] [place] [address] since your last visit? Include any pap smears or colon screening.  no

## 2021-04-29 NOTE — PROGRESS NOTES
HISTORY OF PRESENT ILLNESS  Keira Harry is a 28 y.o. female. Ms. 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 The patient asked to see the ladder and indicates that she was not allowed to do so. She notes that she has been told that she should always be allowed to see a location where she is asked to work. She therefore declined to go down the ladder to the new workspace and advised her supervisor. She indicates that she is now required to be seen and given clearance to return to full duty. She has no injury. Mr#: [id number]      History reviewed. No pertinent past medical history. Past Surgical History:   Procedure Laterality Date    HX  SECTION      done 5 years ago     HX ORTHOPAEDIC Right 2018    knee surgery       Family History   Problem Relation Age of Onset    Hypertension Mother     Hypertension Maternal Grandmother     Diabetes Maternal Grandmother     Cancer Neg Hx     Heart Disease Neg Hx        Allergies   Allergen Reactions    Drug Class [Penicillins] Other (comments)     Patient prone to yeast infections w/ABX       Social History     Tobacco Use   Smoking Status Current Some Day Smoker   Smokeless Tobacco Current User       Social History     Substance and Sexual Activity   Alcohol Use Yes    Comment: occasional drinker          Patient Active Problem List   Diagnosis Code    Tobacco use Z72.0    Obesity (BMI 35.0-39.9 without comorbidity) E66.9    Chronic pain of right knee M25.561, G89.29    Vitamin D deficiency E55.9    Hypercholesterolemia E78.00         Current Outpatient Medications:     ergocalciferol (ERGOCALCIFEROL) 1,250 mcg (50,000 unit) capsule, Take 1 Cap by mouth every seven (7) days. , Disp: 12 Cap, Rfl: 4       Review of Systems   Musculoskeletal: Negative for joint pain.        Visit Vitals  /88 (BP 1 Location: Right arm, BP Patient Position: Sitting, BP Cuff Size: Large adult)   Pulse 83   Temp 97.8 °F (36.6 °C) (Temporal)   Resp 14   Ht 5' 7\" (1.702 m)   Wt 256 lb (116.1 kg)   LMP 04/21/2021   SpO2 98%   BMI 40.10 kg/m²       Physical Exam  Vitals signs and nursing note reviewed. Constitutional:       General: She is not in acute distress. Appearance: Normal appearance. She is well-developed. She is obese. She is not ill-appearing. Comments: She has gained an additional 5 pounds in the past month   HENT:      Head: Normocephalic. Eyes:      Extraocular Movements: Extraocular movements intact. Neck:      Musculoskeletal: Neck supple. Cardiovascular:      Rate and Rhythm: Normal rate. Pulmonary:      Effort: Pulmonary effort is normal.   Skin:     General: Skin is warm and dry. Neurological:      Mental Status: She is alert and oriented to person, place, and time. Psychiatric:         Behavior: Behavior normal.         ASSESSMENT and PLAN    ICD-10-CM ICD-9-CM    1. Problems at work  Z56.9 V62.29    2. Obesity (BMI 35.0-39.9 without comorbidity)  E66.9 278.00    Requested letter to return to full duty tomorrow provided  Reminded about the importance of making progress with weight management    Edgar Kerr MD      PLEASE NOTE:   This document has been produced using voice recognition software. Unrecognized errors in transcription may be present.

## 2021-04-29 NOTE — LETTER
NOTIFICATION RETURN TO WORK / SCHOOL 
 
4/29/2021 11:47 AM 
 
Ms. Ricarda Nelson [address] [id number] To Whom It May Concern: 
 
Ricarda Nelson is currently under the care of [address] [place] [address]. She may return to full duty without restriction on: 4/30/2021 If there are questions or concerns please have the patient contact our office. Sincerely, Josiah De La Vega MD

## 2021-08-20 ENCOUNTER — OFFICE VISIT (OUTPATIENT)
Dept: FAMILY MEDICINE CLINIC | Age: 31
End: 2021-08-20
Payer: MEDICAID

## 2021-08-20 VITALS
HEIGHT: 67 IN | WEIGHT: 252.2 LBS | DIASTOLIC BLOOD PRESSURE: 60 MMHG | RESPIRATION RATE: 16 BRPM | OXYGEN SATURATION: 97 % | HEART RATE: 84 BPM | SYSTOLIC BLOOD PRESSURE: 108 MMHG | TEMPERATURE: 98.6 F | BODY MASS INDEX: 39.58 KG/M2

## 2021-08-20 DIAGNOSIS — R42 DIZZINESS: Primary | ICD-10-CM

## 2021-08-20 PROCEDURE — 99213 OFFICE O/P EST LOW 20 MIN: CPT | Performed by: FAMILY MEDICINE

## 2021-08-20 RX ORDER — CYCLOBENZAPRINE HCL 10 MG
TABLET ORAL
COMMUNITY
Start: 2021-08-18 | End: 2022-05-03

## 2021-08-20 RX ORDER — MELOXICAM 15 MG/1
TABLET ORAL
COMMUNITY
Start: 2021-08-18 | End: 2022-05-03

## 2021-08-20 NOTE — PROGRESS NOTES
Brandon Police presents today for   Chief Complaint   Patient presents with    Dizziness     dizzy spells      Visit Vitals  /60 (BP 1 Location: Left upper arm, BP Patient Position: Sitting, BP Cuff Size: Large adult)   Pulse 84   Temp 98.6 °F (37 °C) (Temporal)   Resp 16   Ht 5' 7\" (1.702 m)   Wt 252 lb 3.2 oz (114.4 kg)   SpO2 97%   BMI 39.50 kg/m²         Is someone accompanying this pt? no    Is the patient using any DME equipment during OV? no    Depression Screening:  3 most recent PHQ Screens 8/20/2021   PHQ Not Done -   Little interest or pleasure in doing things Not at all   Feeling down, depressed, irritable, or hopeless Not at all   Total Score PHQ 2 0       Learning Assessment:  Learning Assessment 12/16/2016   PRIMARY LEARNER Patient   HIGHEST LEVEL OF EDUCATION - PRIMARY LEARNER  DID NOT GRADUATE HIGH SCHOOL   BARRIERS PRIMARY LEARNER NONE   CO-LEARNER CAREGIVER No   PRIMARY LANGUAGE ENGLISH   LEARNER PREFERENCE PRIMARY DEMONSTRATION     READING     LISTENING   ANSWERED BY patient   RELATIONSHIP SELF       Travel Screening:    Travel Screening     Question   Response    In the last month, have you been in contact with someone who was confirmed or suspected to have Coronavirus / COVID-19? No / Unsure    Have you had a COVID-19 viral test in the last 14 days? No    Do you have any of the following new or worsening symptoms? None of these    Have you traveled internationally or domestically in the last month? No      Travel History   Travel since 07/20/21     No documented travel since 07/20/21          Health Maintenance reviewed and discussed and ordered per Provider. Health Maintenance Due   Topic Date Due    COVID-19 Vaccine (1) Never done    DTaP/Tdap/Td series (2 - Tdap) 08/12/2005    PAP AKA CERVICAL CYTOLOGY  Never done   . Coordination of Care:  1. Have you been to the ER, urgent care clinic since your last visit? Hospitalized since your last visit? no    2.  Have you seen or consulted any other health care providers outside of the [address] [place] [address] since your last visit? Include any pap smears or colon screening. Yes.

## 2021-08-20 NOTE — PATIENT INSTRUCTIONS
Try to avoid sudden movements  Return for follow-up in a week or so, sooner for new or worsening symptoms

## 2021-08-20 NOTE — PROGRESS NOTES
HISTORY OF PRESENT ILLNESS  Jorje Cohen is a 28 y.o. female. She reports about 3 episodes of dizziness including yesterday. She has some difficulty describing these but she seems to feel briefly unbalanced. Apparently the duration is a matter of seconds. She has not noted any auditory or neurologic symptoms in addition to this. Denies any symptoms of illness. She states that she only had 1 very mild episode today. These always seem to have occurred when she was moving. Once when she was walking and talking on her phone, once when she turned to throw away an item sometimes moving from sitting to standing but not consistently. She denies any associated nausea. She has headaches but the headaches are no different than her chronic headaches. Mr#: [id number]      History reviewed. No pertinent past medical history.     Past Surgical History:   Procedure Laterality Date    HX  SECTION      done 5 years ago     HX ORTHOPAEDIC Right 2018    knee surgery       Family History   Problem Relation Age of Onset    Hypertension Mother     Hypertension Maternal Grandmother     Diabetes Maternal Grandmother     Cancer Neg Hx     Heart Disease Neg Hx        Allergies   Allergen Reactions    Drug Class [Penicillins] Other (comments)     Patient prone to yeast infections w/ABX       Social History     Tobacco Use   Smoking Status Current Some Day Smoker   Smokeless Tobacco Current User       Social History     Substance and Sexual Activity   Alcohol Use Yes    Comment: occasional drinker          Patient Active Problem List   Diagnosis Code    Tobacco use Z72.0    Obesity (BMI 35.0-39.9 without comorbidity) E66.9    Chronic pain of right knee M25.561, G89.29    Vitamin D deficiency E55.9    Hypercholesterolemia E78.00         Current Outpatient Medications:     cyclobenzaprine (FLEXERIL) 10 mg tablet, , Disp: , Rfl:     meloxicam (MOBIC) 15 mg tablet, , Disp: , Rfl:     ergocalciferol (ERGOCALCIFEROL) 1,250 mcg (50,000 unit) capsule, Take 1 Cap by mouth every seven (7) days. , Disp: 12 Cap, Rfl: 4       Review of Systems   Constitutional: Negative for fever. Eyes: Negative for blurred vision, double vision and photophobia. Cardiovascular: Negative for chest pain, palpitations and leg swelling. Gastrointestinal: Negative for nausea and vomiting. Neurological: Positive for dizziness ( See HPI) and headaches ( Chronic, unchanged). Negative for speech change and focal weakness. Visit Vitals  BP (!) 110/58 (BP 1 Location: Left upper arm, BP Patient Position: Sitting, BP Cuff Size: Large adult)   Pulse 84   Temp 98.6 °F (37 °C) (Temporal)   Resp 16   Ht 5' 7\" (1.702 m)   Wt 252 lb 3.2 oz (114.4 kg)   LMP 07/16/2021 (Approximate)   SpO2 97%   BMI 39.50 kg/m²       Physical Exam  Vitals and nursing note reviewed. Constitutional:       General: She is not in acute distress. Appearance: Normal appearance. She is well-developed. She is obese. She is not ill-appearing. HENT:      Head: Normocephalic. Comments: Negative Ellerslie-Hallpike maneuver     Right Ear: Tympanic membrane, ear canal and external ear normal.      Left Ear: Tympanic membrane, ear canal and external ear normal.   Eyes:      Extraocular Movements: Extraocular movements intact. Conjunctiva/sclera: Conjunctivae normal.      Pupils: Pupils are equal, round, and reactive to light. Comments: No nystagmus   Cardiovascular:      Rate and Rhythm: Normal rate and regular rhythm. Heart sounds: Normal heart sounds. Pulmonary:      Effort: Pulmonary effort is normal.      Breath sounds: Normal breath sounds. Musculoskeletal:      Cervical back: Neck supple. Lymphadenopathy:      Cervical: No cervical adenopathy. Skin:     General: Skin is warm and dry. Neurological:      Mental Status: She is alert and oriented to person, place, and time.       Comments: Negative Romberg, no gait abnormality   Psychiatric: Mood and Affect: Mood normal.         Behavior: Behavior normal.         Thought Content: Thought content normal.         ASSESSMENT and PLAN    ICD-10-CM ICD-9-CM    1. Dizziness  R42 780.4    Assessment:  Unexplained episodes of dizziness      Health maintenance considerations:  COVID-19 immunization to be discussed at follow-up  Pap smear to be discussed at follow-up    Plan:  Try to avoid sudden movements  Return for follow-up in a week or so, sooner for new or worsening symptoms    Daniel Bradley MD      PLEASE NOTE:   This document has been produced using voice recognition software. Unrecognized errors in transcription may be present.

## 2021-08-20 NOTE — LETTER
NOTIFICATION RETURN TO WORK / SCHOOL    8/20/2021 3:24 PM    Ms. Mary Urrutia  [address] [facility] [address]      To Whom It May Concern:    Mary Urrutia is currently under the care of [address] [place] [address]. She will return to work/school on: April 23, 2021    If there are questions or concerns please have the patient contact our office.         Sincerely,      Jaret Lara MD

## 2021-08-20 NOTE — LETTER
NOTIFICATION RETURN TO WORK / SCHOOL    8/20/2021 3:30 PM    Ms. Hunter Ryan  [address] [facility] [address]      To Whom It May Concern:    Hunter Ryan is currently under the care of [address] [place] [address]. She will return to work/school on: 8/23/2021    If there are questions or concerns please have the patient contact our office.         Sincerely,      Anh Richards MD

## 2022-03-18 PROBLEM — E78.00 HYPERCHOLESTEROLEMIA: Status: ACTIVE | Noted: 2021-04-01

## 2022-04-21 DIAGNOSIS — E55.9 VITAMIN D DEFICIENCY: ICD-10-CM

## 2022-04-21 RX ORDER — ERGOCALCIFEROL 1.25 MG/1
CAPSULE ORAL
Qty: 12 CAPSULE | Refills: 4 | Status: SHIPPED | OUTPATIENT
Start: 2022-04-21

## 2022-05-03 ENCOUNTER — APPOINTMENT (OUTPATIENT)
Dept: FAMILY MEDICINE CLINIC | Age: 32
End: 2022-05-03

## 2022-05-03 ENCOUNTER — OFFICE VISIT (OUTPATIENT)
Dept: FAMILY MEDICINE CLINIC | Age: 32
End: 2022-05-03
Payer: MEDICAID

## 2022-05-03 VITALS
DIASTOLIC BLOOD PRESSURE: 80 MMHG | SYSTOLIC BLOOD PRESSURE: 130 MMHG | HEIGHT: 67 IN | RESPIRATION RATE: 16 BRPM | BODY MASS INDEX: 39.24 KG/M2 | TEMPERATURE: 98.1 F | WEIGHT: 250 LBS | HEART RATE: 69 BPM | OXYGEN SATURATION: 98 %

## 2022-05-03 DIAGNOSIS — Z72.0 TOBACCO USE: ICD-10-CM

## 2022-05-03 DIAGNOSIS — E66.9 OBESITY (BMI 35.0-39.9 WITHOUT COMORBIDITY): ICD-10-CM

## 2022-05-03 DIAGNOSIS — E78.00 HYPERCHOLESTEROLEMIA: ICD-10-CM

## 2022-05-03 DIAGNOSIS — E55.9 VITAMIN D DEFICIENCY: ICD-10-CM

## 2022-05-03 DIAGNOSIS — E78.00 HYPERCHOLESTEROLEMIA: Primary | ICD-10-CM

## 2022-05-03 PROCEDURE — 99214 OFFICE O/P EST MOD 30 MIN: CPT | Performed by: FAMILY MEDICINE

## 2022-05-03 NOTE — PROGRESS NOTES
Donavan Mccall is a 33 y.o. female (: 1990) presenting to address:    Chief Complaint   Patient presents with    Knee Pain     would like a stronger muslce relaxer        Vitals:    22 1500   BP: 130/80   Pulse: 69   Resp: 16   Temp: 98.1 °F (36.7 °C)   TempSrc: Temporal   SpO2: 98%   Weight: 250 lb (113.4 kg)   Height: 5' 7\" (1.702 m)   PainSc:   0 - No pain   LMP: 2022       Hearing/Vision:   No exam data present    Learning Assessment:     Learning Assessment 2016   PRIMARY LEARNER Patient   HIGHEST LEVEL OF EDUCATION - PRIMARY LEARNER  DID NOT GRADUATE HIGH SCHOOL   BARRIERS PRIMARY LEARNER NONE   CO-LEARNER CAREGIVER No   PRIMARY LANGUAGE ENGLISH   LEARNER PREFERENCE PRIMARY DEMONSTRATION     READING     LISTENING   ANSWERED BY patient   RELATIONSHIP SELF     Depression Screening:     3 most recent PHQ Screens 5/3/2022   PHQ Not Done -   Little interest or pleasure in doing things Not at all   Feeling down, depressed, irritable, or hopeless Not at all   Total Score PHQ 2 0     Fall Risk Assessment:     Fall Risk Assessment, last 12 mths 2021   Able to walk? Yes   Fall in past 12 months? 1   Are you worried about falling 0   Is the gait abnormal? 0   Number of falls in past 12 months 1   Fall with injury? 1     Abuse Screening:   No flowsheet data found. ADL Assessment:   No flowsheet data found. Coordination of Care Questionaire:   1. \"Have you been to the ER, urgent care clinic since your last visit? Hospitalized since your last visit? \" No    2. \"Have you seen or consulted any other health care providers outside of the [address] [place] [address] since your last visit? \"yes ortho for finger injury at work   3. For patients aged 39-70: Has the patient had a colonoscopy? NA - based on age     If the patient is female:    4. For patients aged 41-77: Has the patient had a mammogram within the past 2 years? NA - based on age    11.  For patients aged 21-65: Has the patient had a pap smear? Unsure when last one was     Advanced Directive:   1. Do you have an Advanced Directive? NO    2. Would you like information on Advanced Directives?  NO

## 2022-05-03 NOTE — PROGRESS NOTES
HISTORY OF PRESENT ILLNESS  Moira Spear is a 33 y.o. female. She returns for follow-up with a history of hyperlipidemia, vitamin D deficiency and severe obesity and is in need of health assessment and preventative care. Today she reports that her knees are bothering her again and she plans to schedule appointment with her orthopedic surgery consultant. She also request referral for bariatric surgery evaluation. Mr#: [id number]      History reviewed. No pertinent past medical history. Past Surgical History:   Procedure Laterality Date    HX  SECTION      done 5 years ago     HX ORTHOPAEDIC Right 2018    knee surgery       Family History   Problem Relation Age of Onset    Hypertension Mother     Hypertension Maternal Grandmother     Diabetes Maternal Grandmother     Cancer Neg Hx     Heart Disease Neg Hx        Allergies   Allergen Reactions    Drug Class [Penicillins] Other (comments)     Patient prone to yeast infections w/ABX       Social History     Tobacco Use   Smoking Status Current Some Day Smoker   Smokeless Tobacco Current User       Social History     Substance and Sexual Activity   Alcohol Use Yes    Comment: occasional drinker          Patient Active Problem List   Diagnosis Code    Tobacco use Z72.0    Obesity (BMI 35.0-39.9 without comorbidity) E66.9    Chronic pain of right knee M25.561, G89.29    Vitamin D deficiency E55.9    Hypercholesterolemia E78.00         Current Outpatient Medications:     ergocalciferol (ERGOCALCIFEROL) 1,250 mcg (50,000 unit) capsule, TAKE 1 CAPSULE BY MOUTH ONE TIME PER WEEK, Disp: 12 Capsule, Rfl: 4       Review of Systems   Constitutional: Negative for chills, fever and weight loss. HENT: Negative for congestion, ear pain, hearing loss and sore throat. Eyes: Negative for blurred vision and double vision. Respiratory: Negative for cough, shortness of breath and wheezing.     Cardiovascular: Negative for chest pain, palpitations and leg swelling. Gastrointestinal: Negative for abdominal pain, blood in stool, constipation, diarrhea, heartburn, melena, nausea and vomiting. Genitourinary: Negative for dysuria and urgency. Musculoskeletal: Positive for joint pain ( bilateral knees). Negative for myalgias. Skin: Negative for itching and rash. Neurological: Negative for dizziness, tingling, sensory change, focal weakness and headaches. Endo/Heme/Allergies: Negative for environmental allergies. Psychiatric/Behavioral: Negative for depression. The patient is not nervous/anxious and does not have insomnia. Visit Vitals  /80   Pulse 69   Temp 98.1 °F (36.7 °C) (Temporal)   Resp 16   Ht 5' 7\" (1.702 m)   Wt 250 lb (113.4 kg)   LMP 04/27/2022   SpO2 98%   BMI 39.16 kg/m²       Physical Exam  Vitals and nursing note reviewed. Constitutional:       General: She is not in acute distress. Appearance: Normal appearance. She is obese. She is not ill-appearing. HENT:      Head: Normocephalic. Right Ear: Tympanic membrane, ear canal and external ear normal.      Left Ear: Tympanic membrane, ear canal and external ear normal.   Eyes:      Extraocular Movements: Extraocular movements intact. Conjunctiva/sclera: Conjunctivae normal.      Pupils: Pupils are equal, round, and reactive to light. Neck:      Vascular: No carotid bruit. Cardiovascular:      Rate and Rhythm: Normal rate and regular rhythm. Heart sounds: Normal heart sounds. Pulmonary:      Effort: Pulmonary effort is normal.      Breath sounds: Normal breath sounds. Abdominal:      Palpations: Abdomen is soft. Tenderness: There is no abdominal tenderness. Musculoskeletal:         General: No deformity. Cervical back: Neck supple. Right lower leg: No edema. Left lower leg: No edema. Skin:     General: Skin is warm and dry. Neurological:      Mental Status: She is alert and oriented to person, place, and time.    Psychiatric: Mood and Affect: Mood normal.         Behavior: Behavior normal.         ASSESSMENT and PLAN    ICD-10-CM ICD-9-CM    1. Hypercholesterolemia  E78.00 272.0 REFERRAL TO BARIATRIC SURGERY   2. Vitamin D deficiency  E55.9 268.9    3. Obesity (BMI 35.0-39.9 without comorbidity)  E66.9 278.00 REFERRAL TO BARIATRIC SURGERY   4. Tobacco use  Z72.0 305.1    Assessment:  Gradually worsening bilateral knee pain  Status of hyperlipidemia and vitamin D deficiency to be determined pending lab results  Obesity unchanged  Continued cigarette smoking    Health maintenance recommendations:  COVID-19 immunization  PPSV23 immunization declined  Pap smear patient will schedule    Plan:  Follow-up with orthopedic surgeon who provided previous treatment regarding knee pain  Referred for bariatric surgery evaluation  Lab today if possible but if necessary appointment for lab studies, further disposition pending lab results  Continue current medications pending lab results  Avoid dietary starch and sugar and follow program of regular aerobic exercise  Return for routine follow-up with in lab in 1 year or sooner with any problems      Nena Carter MD      PLEASE NOTE:   This document has been produced using voice recognition software. Unrecognized errors in transcription may be present.

## 2022-05-03 NOTE — PATIENT INSTRUCTIONS
Assessment:  Gradually worsening bilateral knee pain  Status of hyperlipidemia and vitamin D deficiency to be determined pending lab results  Obesity unchanged  Continued cigarette smoking    Health maintenance recommendations:  COVID-19 immunization  PPSV23 immunization declined  Pap smear patient will schedule    Plan:  Follow-up with orthopedic surgeon who provided previous treatment regarding knee pain  Referred for bariatric surgery evaluation  Lab today if possible but if necessary appointment for lab studies, further disposition pending lab results  Continue current medications pending lab results  Avoid dietary starch and sugar and follow program of regular aerobic exercise  Return for routine follow-up with in lab in 1 year or sooner with any problems

## 2022-05-04 LAB
25(OH)D3 SERPL-MCNC: 24.3 NG/ML (ref 32–100)
A-G RATIO,AGRAT: 2.1 RATIO (ref 1.1–2.6)
ABSOLUTE LYMPHOCYTE COUNT, 10803: 3.2 K/UL (ref 1–4.8)
ALBUMIN SERPL-MCNC: 4.5 G/DL (ref 3.5–5)
ALP SERPL-CCNC: 56 U/L (ref 25–115)
ALT SERPL-CCNC: 15 U/L (ref 5–40)
ANION GAP SERPL CALC-SCNC: 9 MMOL/L (ref 3–15)
AST SERPL W P-5'-P-CCNC: 18 U/L (ref 10–37)
BASOPHILS # BLD: 0 K/UL (ref 0–0.2)
BASOPHILS NFR BLD: 1 % (ref 0–2)
BILIRUB SERPL-MCNC: 0.4 MG/DL (ref 0.2–1.2)
BUN SERPL-MCNC: 14 MG/DL (ref 6–22)
CALCIUM SERPL-MCNC: 9.5 MG/DL (ref 8.4–10.5)
CHLORIDE SERPL-SCNC: 105 MMOL/L (ref 98–110)
CHOLEST SERPL-MCNC: 198 MG/DL (ref 110–200)
CO2 SERPL-SCNC: 25 MMOL/L (ref 20–32)
CREAT SERPL-MCNC: 0.8 MG/DL (ref 0.5–1.2)
EOSINOPHIL # BLD: 0.2 K/UL (ref 0–0.5)
EOSINOPHIL NFR BLD: 2 % (ref 0–6)
ERYTHROCYTE [DISTWIDTH] IN BLOOD BY AUTOMATED COUNT: 14.1 % (ref 10–15.5)
GFRAA, 66117: >60
GFRNA, 66118: >60
GLOBULIN,GLOB: 2.1 G/DL (ref 2–4)
GLUCOSE SERPL-MCNC: 76 MG/DL (ref 70–99)
GRANULOCYTES,GRANS: 49 % (ref 40–75)
HCT VFR BLD AUTO: 38 % (ref 35.1–46.5)
HDLC SERPL-MCNC: 3.6 MG/DL (ref 0–5)
HDLC SERPL-MCNC: 55 MG/DL
HGB BLD-MCNC: 12 G/DL (ref 11.7–15.5)
LDL/HDL RATIO,LDHD: 2.1
LDLC SERPL CALC-MCNC: 118 MG/DL (ref 50–99)
LYMPHOCYTES, LYMLT: 41 % (ref 20–45)
MCH RBC QN AUTO: 31 PG (ref 26–34)
MCHC RBC AUTO-ENTMCNC: 32 G/DL (ref 31–36)
MCV RBC AUTO: 98 FL (ref 80–99)
MONOCYTES # BLD: 0.6 K/UL (ref 0.1–1)
MONOCYTES NFR BLD: 7 % (ref 3–12)
NEUTROPHILS # BLD AUTO: 3.8 K/UL (ref 1.8–7.7)
NON-HDL CHOLESTEROL, 011976: 143 MG/DL
PLATELET # BLD AUTO: 255 K/UL (ref 140–440)
PMV BLD AUTO: 11.8 FL (ref 9–13)
POTASSIUM SERPL-SCNC: 3.8 MMOL/L (ref 3.5–5.5)
PROT SERPL-MCNC: 6.6 G/DL (ref 6.4–8.3)
RBC # BLD AUTO: 3.89 M/UL (ref 3.8–5.2)
SODIUM SERPL-SCNC: 139 MMOL/L (ref 133–145)
TRIGL SERPL-MCNC: 123 MG/DL (ref 40–149)
TSH SERPL DL<=0.005 MIU/L-ACNC: 2.01 MCU/ML (ref 0.27–4.2)
VLDLC SERPL CALC-MCNC: 25 MG/DL (ref 8–30)
WBC # BLD AUTO: 7.7 K/UL (ref 4–11)

## 2022-05-04 NOTE — PROGRESS NOTES
Please advise that labs are good for the most part with significant improvement in her cholesterol levels since last year. Her vitamin D level is low as expected but this should improve when she starts back taking the ergocalciferol weekly. A prescription was sent at the time of her appointment.

## 2022-07-18 ENCOUNTER — TELEPHONE (OUTPATIENT)
Dept: FAMILY MEDICINE CLINIC | Age: 32
End: 2022-07-18

## 2022-07-18 NOTE — TELEPHONE ENCOUNTER
Pt stated that she has been having lower back pain for a week. She is not sure if it is muscle spasms or not but the pain is bad. Pt stated that she has taken muscle relaxer's and 800 ibuprofen all in which does not work for her. Pt stated that she uses her back brace which helps some. Pt is scheduled for appt with Dr Kailash Barth.  CASIEI    Future Appointments   Date Time Provider Tommie Crooks   7/20/2022  1:00 PM MD JULIANNA Hollins BS AMB   5/11/2023  3:30 PM LAB_JULIANNA LEVINE BS AMB   5/18/2023  3:30 PM MD JULIANNA Hollins BS AMB

## 2023-05-06 DIAGNOSIS — E55.9 VITAMIN D DEFICIENCY: ICD-10-CM

## 2023-05-06 DIAGNOSIS — E78.00 HYPERCHOLESTEROLEMIA: Primary | ICD-10-CM

## 2023-05-06 DIAGNOSIS — E66.9 OBESITY (BMI 35.0-39.9 WITHOUT COMORBIDITY): ICD-10-CM

## 2023-05-06 DIAGNOSIS — R42 DIZZINESS AND GIDDINESS: ICD-10-CM

## 2023-05-18 PROBLEM — E66.01 SEVERE OBESITY (HCC): Status: ACTIVE | Noted: 2023-05-18

## 2023-06-14 RX ORDER — ERGOCALCIFEROL 1.25 MG/1
CAPSULE ORAL
Qty: 4 CAPSULE | OUTPATIENT
Start: 2023-06-14

## 2023-06-15 DIAGNOSIS — E55.9 VITAMIN D DEFICIENCY, UNSPECIFIED: ICD-10-CM

## 2023-06-15 RX ORDER — ERGOCALCIFEROL 1.25 MG/1
CAPSULE ORAL
Qty: 4 CAPSULE | Refills: 13 | Status: SHIPPED | OUTPATIENT
Start: 2023-06-15

## 2023-06-15 NOTE — TELEPHONE ENCOUNTER
last refilled 4/21/22 12 with 4 refills  Last ov 5/3/2022   Future Appointments   Date Time Provider [address]   6/22/2023  8:00 AM Donald Thompson MD [facility] BS AMB

## 2023-06-21 ASSESSMENT — ENCOUNTER SYMPTOMS
CONSTIPATION: 0
NAUSEA: 0
VOMITING: 0
ANAL BLEEDING: 0
DIARRHEA: 0
ABDOMINAL PAIN: 0
BLOOD IN STOOL: 0
COUGH: 0
SHORTNESS OF BREATH: 0
WHEEZING: 0
SORE THROAT: 0

## 2023-06-22 ENCOUNTER — OFFICE VISIT (OUTPATIENT)
Dept: FAMILY MEDICINE CLINIC | Facility: CLINIC | Age: 33
End: 2023-06-22
Payer: MEDICAID

## 2023-06-22 VITALS
SYSTOLIC BLOOD PRESSURE: 120 MMHG | OXYGEN SATURATION: 98 % | TEMPERATURE: 97.6 F | RESPIRATION RATE: 16 BRPM | DIASTOLIC BLOOD PRESSURE: 70 MMHG | WEIGHT: 257 LBS | HEIGHT: 67 IN | HEART RATE: 73 BPM | BODY MASS INDEX: 40.34 KG/M2

## 2023-06-22 DIAGNOSIS — E78.00 HYPERCHOLESTEROLEMIA: ICD-10-CM

## 2023-06-22 DIAGNOSIS — E55.9 VITAMIN D DEFICIENCY: ICD-10-CM

## 2023-06-22 DIAGNOSIS — M79.676 PAIN DUE TO ONYCHOMYCOSIS OF TOENAIL: Primary | ICD-10-CM

## 2023-06-22 DIAGNOSIS — R19.8 SYMPTOMS OF GASTROESOPHAGEAL REFLUX: ICD-10-CM

## 2023-06-22 DIAGNOSIS — G56.03 BILATERAL CARPAL TUNNEL SYNDROME: ICD-10-CM

## 2023-06-22 DIAGNOSIS — E66.01 SEVERE OBESITY (HCC): ICD-10-CM

## 2023-06-22 DIAGNOSIS — Z00.00 ROUTINE GENERAL MEDICAL EXAMINATION AT A HEALTH CARE FACILITY: ICD-10-CM

## 2023-06-22 DIAGNOSIS — E78.00 HYPERCHOLESTEROLEMIA: Primary | ICD-10-CM

## 2023-06-22 DIAGNOSIS — B35.1 PAIN DUE TO ONYCHOMYCOSIS OF TOENAIL: Primary | ICD-10-CM

## 2023-06-22 PROCEDURE — 99395 PREV VISIT EST AGE 18-39: CPT | Performed by: FAMILY MEDICINE

## 2023-06-22 PROCEDURE — 99214 OFFICE O/P EST MOD 30 MIN: CPT | Performed by: FAMILY MEDICINE

## 2023-06-22 RX ORDER — FAMOTIDINE 20 MG/1
20 TABLET, FILM COATED ORAL 2 TIMES DAILY
Qty: 180 TABLET | Refills: 3 | Status: SHIPPED | OUTPATIENT
Start: 2023-06-22

## 2023-06-22 SDOH — ECONOMIC STABILITY: HOUSING INSECURITY
IN THE LAST 12 MONTHS, WAS THERE A TIME WHEN YOU DID NOT HAVE A STEADY PLACE TO SLEEP OR SLEPT IN A SHELTER (INCLUDING NOW)?: NO

## 2023-06-22 SDOH — ECONOMIC STABILITY: INCOME INSECURITY: HOW HARD IS IT FOR YOU TO PAY FOR THE VERY BASICS LIKE FOOD, HOUSING, MEDICAL CARE, AND HEATING?: SOMEWHAT HARD

## 2023-06-22 SDOH — ECONOMIC STABILITY: FOOD INSECURITY: WITHIN THE PAST 12 MONTHS, THE FOOD YOU BOUGHT JUST DIDN'T LAST AND YOU DIDN'T HAVE MONEY TO GET MORE.: NEVER TRUE

## 2023-06-22 SDOH — ECONOMIC STABILITY: FOOD INSECURITY: WITHIN THE PAST 12 MONTHS, YOU WORRIED THAT YOUR FOOD WOULD RUN OUT BEFORE YOU GOT MONEY TO BUY MORE.: NEVER TRUE

## 2023-06-22 ASSESSMENT — ENCOUNTER SYMPTOMS: ROS SKIN COMMENTS: INGROWN TOENAILS

## 2023-06-22 ASSESSMENT — PATIENT HEALTH QUESTIONNAIRE - PHQ9
1. LITTLE INTEREST OR PLEASURE IN DOING THINGS: 0
SUM OF ALL RESPONSES TO PHQ QUESTIONS 1-9: 0
SUM OF ALL RESPONSES TO PHQ9 QUESTIONS 1 & 2: 0
SUM OF ALL RESPONSES TO PHQ QUESTIONS 1-9: 0
2. FEELING DOWN, DEPRESSED OR HOPELESS: 0

## 2023-06-22 NOTE — PATIENT INSTRUCTIONS
Current Status:  Significant increase in lipid levels to be addressed  Low vitamin D level picky to improve back on vitamin D supplement  Obesity with increasing weight  Carpal tunnel syndrome  Reflux symptoms to be addressed    Health Maintenance Recommendations:  COVID-19 immunization with bivalent vaccine  PCV-20 immunization (indication history of smoking)-declined  Pap smear-current, records requested    Plan:  Podiatry referral  Begin famotidine 20 mg twice daily for reflux symptoms, avoid eating within 3 hours of bedtime  Obtain cock-up wrist splints and wear these nightly to prevent worsening of carpal tunnel symptoms  Follow printed low-carb diet guidelines  Avoid dietary starch and sugar and as much as possible follow program of regular aerobic exercise.   Vitamin D supplement recommended  Return for fasting lab followed by office evaluation in 6 months, return sooner with any problems

## 2023-06-22 NOTE — PROGRESS NOTES
Yadira Abrams is a 29 y.o. female (: 1990) presenting to address:    Chief Complaint   Patient presents with    Annual Exam       Vitals:    23 0757   BP: 120/70   Pulse: 73   Resp: 16   Temp: 97.6 °F (36.4 °C)   SpO2: 98%       Coordination of Care Questionaire:   1. \"Have you been to the ER, urgent care clinic since your last visit? Hospitalized since your last visit? \" No    2. \"Have you seen or consulted any other health care providers outside of the [address] [place] [address] since your last visit? \" No     3. For patients aged 39-70: Has the patient had a colonoscopy / FIT/ Cologuard? NA - based on age      If the patient is female:    4. For patients aged 41-77: Has the patient had a mammogram within the past 2 years? NA - based on age or sex      11. For patients aged 21-65: Has the patient had a pap smear? Yes - Care Gap present. Rooming MA/LPN to request most recent results    Advanced Directive:   1. Do you have an Advanced Directive? No    2. Would you like information on Advanced Directives?  No
for cough, shortness of breath and wheezing. Cardiovascular:  Negative for chest pain, palpitations and leg swelling. Gastrointestinal:  Negative for abdominal pain, anal bleeding, blood in stool, constipation, diarrhea, nausea and vomiting. Frequent reflux Sx   Genitourinary:  Negative for difficulty urinating, dysuria, hematuria and menstrual problem. Skin:  Negative for rash. Ingrown toenails   Allergic/Immunologic: Negative for environmental allergies and food allergies. Neurological:  Positive for numbness (tingling, hands). Negative for dizziness, light-headedness and headaches. Psychiatric/Behavioral:  Negative for dysphoric mood and suicidal ideas. The patient is not nervous/anxious. /70   Pulse 73   Temp 97.6 °F (36.4 °C) (Temporal)   Resp 16   Ht 5' 7\" (1.702 m)   Wt 257 lb (116.6 kg)   LMP 05/29/2023   SpO2 98%   BMI 40.25 kg/m²     Physical Exam  Constitutional:       General: She is not in acute distress. Appearance: Normal appearance. She is obese. She is not ill-appearing. HENT:      Head: Normocephalic. Right Ear: Tympanic membrane, ear canal and external ear normal.      Left Ear: Tympanic membrane, ear canal and external ear normal.      Mouth/Throat:      Mouth: Mucous membranes are moist.      Pharynx: Oropharynx is clear. Eyes:      Extraocular Movements: Extraocular movements intact. Conjunctiva/sclera: Conjunctivae normal.      Pupils: Pupils are equal, round, and reactive to light. Neck:      Vascular: No carotid bruit. Cardiovascular:      Rate and Rhythm: Normal rate and regular rhythm. Heart sounds: Normal heart sounds. Pulmonary:      Effort: Pulmonary effort is normal.      Breath sounds: Normal breath sounds. Abdominal:      Palpations: Abdomen is soft. Tenderness: There is no abdominal tenderness. Musculoskeletal:      Cervical back: Normal range of motion and neck supple.    Skin:     General: Skin is